# Patient Record
Sex: MALE | Race: WHITE
[De-identification: names, ages, dates, MRNs, and addresses within clinical notes are randomized per-mention and may not be internally consistent; named-entity substitution may affect disease eponyms.]

---

## 2018-01-11 ENCOUNTER — HOSPITAL ENCOUNTER (EMERGENCY)
Dept: HOSPITAL 62 - ER | Age: 33
LOS: 1 days | Discharge: HOME | End: 2018-01-12
Payer: MEDICARE

## 2018-01-11 VITALS — DIASTOLIC BLOOD PRESSURE: 82 MMHG | SYSTOLIC BLOOD PRESSURE: 153 MMHG

## 2018-01-11 DIAGNOSIS — R09.81: ICD-10-CM

## 2018-01-11 DIAGNOSIS — R05: ICD-10-CM

## 2018-01-11 DIAGNOSIS — R07.89: Primary | ICD-10-CM

## 2018-01-11 DIAGNOSIS — R09.89: ICD-10-CM

## 2018-01-11 DIAGNOSIS — J02.9: ICD-10-CM

## 2018-01-11 LAB
ADD MANUAL DIFF: NO
ALBUMIN SERPL-MCNC: 4.5 G/DL (ref 3.5–5)
ALP SERPL-CCNC: 63 U/L (ref 38–126)
ALT SERPL-CCNC: 40 U/L (ref 21–72)
ANION GAP SERPL CALC-SCNC: 12 MMOL/L (ref 5–19)
AST SERPL-CCNC: 21 U/L (ref 17–59)
BASOPHILS # BLD AUTO: 0 10^3/UL (ref 0–0.2)
BASOPHILS NFR BLD AUTO: 0.2 % (ref 0–2)
BILIRUB DIRECT SERPL-MCNC: 0.2 MG/DL (ref 0–0.4)
BILIRUB SERPL-MCNC: 0.3 MG/DL (ref 0.2–1.3)
BUN SERPL-MCNC: 13 MG/DL (ref 7–20)
CALCIUM: 9.9 MG/DL (ref 8.4–10.2)
CHLORIDE SERPL-SCNC: 102 MMOL/L (ref 98–107)
CK SERPL-CCNC: 72 U/L (ref 55–170)
CO2 SERPL-SCNC: 30 MMOL/L (ref 22–30)
EOSINOPHIL # BLD AUTO: 0.2 10^3/UL (ref 0–0.6)
EOSINOPHIL NFR BLD AUTO: 3.3 % (ref 0–6)
ERYTHROCYTE [DISTWIDTH] IN BLOOD BY AUTOMATED COUNT: 13.2 % (ref 11.5–14)
GLUCOSE SERPL-MCNC: 104 MG/DL (ref 75–110)
HCT VFR BLD CALC: 41.1 % (ref 37.9–51)
HGB BLD-MCNC: 14 G/DL (ref 13.5–17)
LYMPHOCYTES # BLD AUTO: 1.8 10^3/UL (ref 0.5–4.7)
LYMPHOCYTES NFR BLD AUTO: 28.5 % (ref 13–45)
MCH RBC QN AUTO: 31.6 PG (ref 27–33.4)
MCHC RBC AUTO-ENTMCNC: 34.1 G/DL (ref 32–36)
MCV RBC AUTO: 93 FL (ref 80–97)
MONOCYTES # BLD AUTO: 0.9 10^3/UL (ref 0.1–1.4)
MONOCYTES NFR BLD AUTO: 14.8 % (ref 3–13)
NEUTROPHILS # BLD AUTO: 3.3 10^3/UL (ref 1.7–8.2)
NEUTS SEG NFR BLD AUTO: 53.2 % (ref 42–78)
PLATELET # BLD: 195 10^3/UL (ref 150–450)
POTASSIUM SERPL-SCNC: 3.9 MMOL/L (ref 3.6–5)
PROT SERPL-MCNC: 7.1 G/DL (ref 6.3–8.2)
RBC # BLD AUTO: 4.43 10^6/UL (ref 4.35–5.55)
SODIUM SERPL-SCNC: 143.5 MMOL/L (ref 137–145)
TOTAL CELLS COUNTED % (AUTO): 100 %
WBC # BLD AUTO: 6.3 10^3/UL (ref 4–10.5)

## 2018-01-11 PROCEDURE — 85025 COMPLETE CBC W/AUTO DIFF WBC: CPT

## 2018-01-11 PROCEDURE — 84484 ASSAY OF TROPONIN QUANT: CPT

## 2018-01-11 PROCEDURE — 82553 CREATINE MB FRACTION: CPT

## 2018-01-11 PROCEDURE — 80053 COMPREHEN METABOLIC PANEL: CPT

## 2018-01-11 PROCEDURE — 36415 COLL VENOUS BLD VENIPUNCTURE: CPT

## 2018-01-11 PROCEDURE — 93010 ELECTROCARDIOGRAM REPORT: CPT

## 2018-01-11 PROCEDURE — 82550 ASSAY OF CK (CPK): CPT

## 2018-01-11 PROCEDURE — 71046 X-RAY EXAM CHEST 2 VIEWS: CPT

## 2018-01-11 PROCEDURE — 99285 EMERGENCY DEPT VISIT HI MDM: CPT

## 2018-01-11 PROCEDURE — 93005 ELECTROCARDIOGRAM TRACING: CPT

## 2018-01-11 NOTE — ER DOCUMENT REPORT
ED Medical Screen (RME)





- General


Chief Complaint: Chest Pain


Stated Complaint: CHEST PAIN


Time Seen by Provider: 18 20:16


Mode of Arrival: Ambulatory


Information source: Patient


Notes: 





32-year-old male presents to ED for cough cold congestion sore throat shortness 

of breath.  Mother is with him states he has had a cough and cold for several 

weeks with chest pain started a couple days ago much worse today.  States they 

were more concerned because father had coronary artery disease and  of a 

massive heart attack at 42.  Patient has not had any aspirin today.  His lungs 

are clear to auscultation bilaterally.





I have greeted and performed a rapid initial assessment of this patient.  A 

comprehensive ED assessment and evaluation of the patient, analysis of test 

results and completion of medical decision making process will be conducted by 

an additional ED providers.


TRAVEL OUTSIDE OF THE [place] IN LAST 30 DAYS: No





- Related Data


Allergies/Adverse Reactions: 


 





No Known Allergies Allergy (Verified 18 18:00)


 











Physical Exam





- Vital signs


Vitals: 





 











Temp Pulse Resp BP Pulse Ox


 


 98.7 F   85   18   156/90 H  96 


 


 18 18:10  18 18:10  18 18:10  18 18:10  18 18:10














Course





- Vital Signs


Vital signs: 





 











Temp Pulse Resp BP Pulse Ox


 


 98.7 F   90   18   153/82 H  98 


 


 18 18:10  18 20:13  18 20:13  18 20:13  18 20:13

## 2018-01-11 NOTE — EKG REPORT
SEVERITY:- ABNORMAL ECG -

SINUS RHYTHM

INCOMPLETE RIGHT BUNDLE BRANCH BLOCK

ST ELEV, PROBABLE NORMAL EARLY REPOL PATTERN

:

Confirmed by: Nicolás Mckeon MD 11-Jan-2018 18:50:11

## 2018-01-11 NOTE — RADIOLOGY REPORT (SQ)
EXAM DESCRIPTION:  CHEST PA/LAT



COMPLETED DATE/TIME:  1/11/2018 8:30 pm



REASON FOR STUDY:  cough congestion chest pain



COMPARISON:  None.



EXAM PARAMETERS:  NUMBER OF VIEWS: two views

TECHNIQUE: Digital Frontal and Lateral radiographic views of the chest acquired.

RADIATION DOSE: NA

LIMITATIONS: none



FINDINGS:  LUNGS AND PLEURA: No opacities, masses or pneumothorax. No pleural effusion.

MEDIASTINUM AND HILAR STRUCTURES: No masses or contour abnormalities.

HEART AND VASCULAR STRUCTURES: Heart normal size.  No evidence for failure.

BONES: No acute findings.

HARDWARE: None in the chest.

OTHER: No other significant finding.



IMPRESSION:  NO SIGNIFICANT RADIOGRAPHIC FINDING IN THE CHEST.



TECHNICAL DOCUMENTATION:  JOB ID:  [id number]

 2011 [employer]- All Rights Reserved

## 2018-01-11 NOTE — ER DOCUMENT REPORT
ED General





- General


Chief Complaint: Chest Pain


Stated Complaint: CHEST PAIN


Time Seen by Provider: 01/11/18 20:16


Mode of Arrival: Ambulatory


Notes: 





32-year-old male here with complaints of cough congestion runny nose sore 

throat ongoing for the past few weeks and as of the past few days has been 

having shortness of breath wheezing and chest pain worse with coughing but not 

worse with exertion or breathing.  Mother states that he has tried albuterol 

and hydroxyzine with minimal relief.  He has seen her PCP for the same thing 

over the past month with no interventions.


TRAVEL OUTSIDE OF THE [place] IN LAST 30 DAYS: No





- Related Data


Allergies/Adverse Reactions: 


 





No Known Allergies Allergy (Verified 01/11/18 18:00)


 











Past Medical History





- General


Information source: Patient





- Social History


Smoking Status: Never Smoker


Family History: None


Patient has suicidal ideation: No


Patient has homicidal ideation: No


Renal/ Medical History: Denies: Hx Peritoneal Dialysis


Psychiatric Medical History: Reports: Hx Attention Deficit Hyperactivity 

Disorder





Review of Systems





- Review of Systems


Notes: 





See history of present illness for pertinent positive review of systems; 

otherwise all review of systems have been reviewed and are negative





Physical Exam





- Vital signs


Vitals: 


 











Temp Pulse Resp BP Pulse Ox


 


 98.7 F   85   18   156/90 H  96 


 


 01/11/18 18:10  01/11/18 18:10  01/11/18 18:10  01/11/18 18:10  01/11/18 18:10














- Notes


Notes: 





PHYSICAL EXAMINATION:





GENERAL: Well-appearing and in no acute distress.





HEAD: Atraumatic, normocephalic.





EYES: Pupils equal round and reactive to light, extraocular movements intact, 

sclera anicteric, conjunctiva are normal.





ENT: nares patent, oropharynx minimal erythema without exudates.  Moist mucous 

membranes.





NECK: Normal range of motion, supple without lymphadenopathy





LUNGS: CTAB and equal.  No wheezes rales or rhonchi.





HEART: Regular rate and rhythm without murmurs





ABDOMEN: Soft, no tenderness.  No guarding, no rebound





EXTREMITIES: Normal range of motion, no pitting edema.  No cyanosis.





NEUROLOGICAL: Cranial nerves grossly intact. Normal sensory/motor exams.





PSYCH: Normal mood, normal affect.





SKIN: Warm, Dry, normal turgor, no rashes or lesions noted





Course





- Re-evaluation


Re-evalutation: 





01/11/18 23:22


MEDICAL DECISION MAKING:


Concern for allergic rhinitis versus bronchitis versus pneumonia


Will obtain workup and go from there


Patient understands and agrees to the plan of care





01/11/18 23:54


Results reviewed there is no leukocytosis hemoglobin is normal electrolytes 

unremarkable negative troponin chest x-ray does not show any acute emergent 

process; will prescribe azithromycin and Tessalon with instructions follow-up 

PCP next day or few and mother understands agrees plan of care





- Vital Signs


Vital signs: 


 











Temp Pulse Resp BP Pulse Ox


 


 98.7 F   90   18   153/82 H  98 


 


 01/11/18 18:10  01/11/18 20:13  01/11/18 20:13  01/11/18 20:13  01/11/18 20:13














- Laboratory


Result Diagrams: 


 01/11/18 21:07





 01/11/18 21:07


Laboratory results interpreted by me: 


 











  01/11/18





  21:07


 


Monocytes %  14.8 H














Discharge





- Discharge


Clinical Impression: 


 Chest pain, atypical





Condition: Good


Disposition: HOME, SELF-CARE


Additional Instructions: 


Please finish the antibiotics and do not skip any doses.  You were seen in the 

emergency department at [facility]. If you were given any 

sedating medications, be sure not to operate heavy machinery (example - driving

) and be sure you are not too sedated to walk appropriately.  Please followup 

with your primary physician in the next few days for further management/

evaluation.  Please return to the emergency department for worsening of 

symptoms or any symptom that you deem to be concerning or life-threatening.  

Thank you for allowing us to be part of your care.


Prescriptions: 


Benzonatate [Tessalon Perles 100 mg Capsule] 100 mg PO Q8HP PRN #40 capsule


 PRN Reason: 


Azithromycin [Zithromax 250 mg Tablet] 250 mg PO ASDIR PRN #6 tablet


 PRN Reason:

## 2018-01-24 ENCOUNTER — HOSPITAL ENCOUNTER (OUTPATIENT)
Dept: HOSPITAL 62 - RAD | Age: 33
End: 2018-01-24
Attending: FAMILY MEDICINE
Payer: MEDICARE

## 2018-01-24 DIAGNOSIS — R42: Primary | ICD-10-CM

## 2018-01-24 PROCEDURE — 70450 CT HEAD/BRAIN W/O DYE: CPT

## 2018-01-24 NOTE — RADIOLOGY REPORT (SQ)
EXAM DESCRIPTION:  CT HEAD WITHOUT



COMPLETED DATE/TIME:  1/24/2018 4:44 pm



REASON FOR STUDY:  R42 DIZZINESS AND GIDDINESS R42  DIZZINESS AND GIDDINESS



COMPARISON:  None.



TECHNIQUE:  Axial images acquired through the brain without intravenous contrast.  Images reviewed wi
th bone, brain and subdural windows.  Images stored on PACS.

All CT scanners at this facility use dose modulation, iterative reconstruction, and/or weight based d
osing when appropriate to reduce radiation dose to as low as reasonably achievable (ALARA).

[facility]: Dose Right  [facility]: CareDose    [facility]: Dose Right    [facility]: Teradose 4D    [facility]: Smart Technologies



RADIATION DOSE:  CT Rad equipment meets quality standard of care and radiation dose reduction techniq
ues were employed. CTDIvol: 49.0 mGy. DLP: 881 mGy-cm. mGy.



LIMITATIONS:  None.



FINDINGS:  VENTRICLES: Normal size and contour.

CEREBRUM: No masses.  No hemorrhage.  No midline shift.  No evidence for acute infarction. Normal gra
y/white matter differentiation. No areas of low density in the white matter.

CEREBELLUM: No masses.  No hemorrhage.  No alteration of density.  No evidence for acute infarction.

EXTRAAXIAL SPACES: No fluid collections.  No masses.

ORBITS AND GLOBE: No intra- or extraconal masses.  Normal contour of globe without masses.

CALVARIUM: No fracture.

PARANASAL SINUSES: No fluid or mucosal thickening.

SOFT TISSUES: No mass or hematoma.

OTHER: No other significant finding.



IMPRESSION:  NORMAL BRAIN CT WITHOUT CONTRAST.

EVIDENCE OF ACUTE STROKE: NO.



COMMENT:  Quality ID # [id number]: Final reports with documentation of one or more dose reduction techniques
 (e.g., Automated exposure control, adjustment of the mA and/or kV according to patient size, use of 
iterative reconstruction technique)



TECHNICAL DOCUMENTATION:  JOB ID:  [id number]

 2011 [employer]- All Rights Reserved

## 2018-05-08 ENCOUNTER — HOSPITAL ENCOUNTER (OUTPATIENT)
Dept: HOSPITAL 62 - RAD | Age: 33
End: 2018-05-08
Attending: FAMILY MEDICINE
Payer: COMMERCIAL

## 2018-05-08 DIAGNOSIS — R05: Primary | ICD-10-CM

## 2018-05-08 PROCEDURE — 71046 X-RAY EXAM CHEST 2 VIEWS: CPT

## 2018-05-08 NOTE — RADIOLOGY REPORT (SQ)
EXAM DESCRIPTION:  CHEST 2 VIEWS



COMPLETED DATE/TIME:  5/8/2018 4:57 pm



REASON FOR STUDY:  COUGH



COMPARISON:  1/11/2018



EXAM PARAMETERS:  NUMBER OF VIEWS: two views

TECHNIQUE: Digital Frontal and Lateral radiographic views of the chest acquired.

RADIATION DOSE: NA

LIMITATIONS: none



FINDINGS:  LUNGS AND PLEURA: No opacities, masses or pneumothorax. No pleural effusion.

MEDIASTINUM AND HILAR STRUCTURES: No masses or contour abnormalities.

HEART AND VASCULAR STRUCTURES: Heart normal size.  No evidence for failure.

BONES: No acute findings.

HARDWARE: None in the chest.

OTHER: No other significant finding.



IMPRESSION:  NO ACUTE RADIOGRAPHIC FINDING IN THE CHEST.



TECHNICAL DOCUMENTATION:  JOB ID:  [id number]

 2011 [employer]- All Rights Reserved



Reading location - IP/workstation name: [email or web address]